# Patient Record
Sex: MALE | Race: OTHER | HISPANIC OR LATINO | URBAN - METROPOLITAN AREA
[De-identification: names, ages, dates, MRNs, and addresses within clinical notes are randomized per-mention and may not be internally consistent; named-entity substitution may affect disease eponyms.]

---

## 2022-05-24 ENCOUNTER — EMERGENCY (EMERGENCY)
Facility: HOSPITAL | Age: 47
LOS: 1 days | Discharge: HOME | End: 2022-05-24
Admitting: EMERGENCY MEDICINE
Payer: COMMERCIAL

## 2022-05-24 VITALS
OXYGEN SATURATION: 99 % | RESPIRATION RATE: 18 BRPM | WEIGHT: 184.97 LBS | SYSTOLIC BLOOD PRESSURE: 148 MMHG | HEART RATE: 100 BPM | DIASTOLIC BLOOD PRESSURE: 92 MMHG | TEMPERATURE: 98 F | HEIGHT: 69 IN

## 2022-05-24 DIAGNOSIS — Z20.822 CONTACT WITH AND (SUSPECTED) EXPOSURE TO COVID-19: ICD-10-CM

## 2022-05-24 DIAGNOSIS — J02.9 ACUTE PHARYNGITIS, UNSPECIFIED: ICD-10-CM

## 2022-05-24 DIAGNOSIS — R68.83 CHILLS (WITHOUT FEVER): ICD-10-CM

## 2022-05-24 DIAGNOSIS — M79.10 MYALGIA, UNSPECIFIED SITE: ICD-10-CM

## 2022-05-24 LAB
FLUAV AG NPH QL: SIGNIFICANT CHANGE UP
FLUBV AG NPH QL: SIGNIFICANT CHANGE UP
RSV RNA NPH QL NAA+NON-PROBE: SIGNIFICANT CHANGE UP
SARS-COV-2 RNA SPEC QL NAA+PROBE: SIGNIFICANT CHANGE UP

## 2022-05-24 PROCEDURE — 99284 EMERGENCY DEPT VISIT MOD MDM: CPT | Mod: 25

## 2022-05-24 PROCEDURE — 99053 MED SERV 10PM-8AM 24 HR FAC: CPT

## 2022-05-24 PROCEDURE — 87637 SARSCOV2&INF A&B&RSV AMP PRB: CPT

## 2022-05-24 PROCEDURE — 71046 X-RAY EXAM CHEST 2 VIEWS: CPT | Mod: 26

## 2022-05-24 PROCEDURE — 99283 EMERGENCY DEPT VISIT LOW MDM: CPT | Mod: 25

## 2022-05-24 PROCEDURE — 71046 X-RAY EXAM CHEST 2 VIEWS: CPT

## 2022-05-24 NOTE — ED PROVIDER NOTE - NSFOLLOWUPINSTRUCTIONS_ED_ALL_ED_FT
You were seen in the Emergency Department for a sore throat and cough.   Your xray did now show any evidence of pneumonia.   This is likely a viral illness that will take a few days to run its course.   You covid / flu test is still pending - someone will call you with the results if positive, you can also find the results on the patient portal.     Drink plenty of fluids, get plenty of rest.   Take over the counter medications as needed for symptoms.  You can take tylenol / motrin for fever / body aches.   Take over the counter cough medications / decongestants as needed for symptoms - take as directed on the bottle.     Follow up with your primary care doctor within one week to ensure your symptoms are improving.     Return to the Emergency Department for persistent, worsening or new symptoms including high fever, pain that does not get better with the pain medication, chest pain, shortness of breath, persistent nausea/vomiting, abdominal pain, or any other serious concerns.

## 2022-05-24 NOTE — ED ADULT NURSE NOTE - OBJECTIVE STATEMENT
Pt arrived to ED AAOX4, spont unlab breathing on RA, NAD. PT is c/o mucus build up x 4days. Pt states he has had 3 negative covid tests. States he feels like he has a hard time clearing secretions. VS stable.

## 2022-05-24 NOTE — ED PROVIDER NOTE - CLINICAL SUMMARY MEDICAL DECISION MAKING FREE TEXT BOX
pt visiting from [place] w sore throat and nasal congestion x 2 days. congestion inhibiting him from sleeping today.   pt well appearing, vitals wnl.   suspect viral illness. will send covid / flu testing.   low suspicion for pneumonia but will get cxr to r/o.   no evidence of strep or PTA on exam.   suspect dc home w supportive care.

## 2022-05-24 NOTE — ED ADULT TRIAGE NOTE - OTHER COMPLAINTS
CC of trouble of clearing his throat from mucus x today. Usually worst when he is laying down that he need to catch his breath because unable to clear his throat.

## 2022-05-24 NOTE — ED PROVIDER NOTE - ENMT, MLM
Airway patent, Nasal mucosa clear. Mouth with normal mucosa. Mild erythema to posterior oropharynx. Otherwise throat has no vesicles, no oropharyngeal exudates and uvula is midline.

## 2022-05-24 NOTE — ED PROVIDER NOTE - PATIENT PORTAL LINK FT
You can access the FollowMyHealth Patient Portal offered by [facility] by registering at the following website: [email or web address]. By joining [facility]’s FollowMyHealth portal, you will also be able to view your health information using other applications (apps) compatible with our system.

## 2022-05-24 NOTE — ED PROVIDER NOTE - CARDIAC, MLM
Normal rate, regular rhythm.  Heart sounds S1, S2.  No murmurs, rubs or gallops. Extremities warm and well perfused. 2+ radial pulses.